# Patient Record
Sex: MALE | Race: WHITE | Employment: FULL TIME | URBAN - NONMETROPOLITAN AREA
[De-identification: names, ages, dates, MRNs, and addresses within clinical notes are randomized per-mention and may not be internally consistent; named-entity substitution may affect disease eponyms.]

---

## 2017-12-12 ENCOUNTER — TRANSFERRED RECORDS (OUTPATIENT)
Dept: HEALTH INFORMATION MANAGEMENT | Facility: HOSPITAL | Age: 22
End: 2017-12-12

## 2017-12-12 LAB
ALT SERPL-CCNC: 40 U/L (ref 18–65)
AST SERPL-CCNC: 16 U/L (ref 10–40)
CHOLEST SERPL-MCNC: 182 MG/DL
CREAT SERPL-MCNC: 1.02 MG/DL (ref 0.8–1.5)
GLUCOSE SERPL-MCNC: 82 MG/DL (ref 60–99)
HDLC SERPL-MCNC: 28 MG/DL
LDLC SERPL CALC-MCNC: 109 MG/DL
POTASSIUM SERPL-SCNC: 5.8 MEQ/L (ref 3.5–5.1)
TRIGL SERPL-MCNC: 225 MG/DL
TSH SERPL-ACNC: 1.72 MIU/ML (ref 0.35–4.8)

## 2018-01-23 ENCOUNTER — OFFICE VISIT (OUTPATIENT)
Dept: SLEEP MEDICINE | Facility: HOSPITAL | Age: 23
End: 2018-01-23
Attending: INTERNAL MEDICINE
Payer: COMMERCIAL

## 2018-01-23 VITALS
HEART RATE: 64 BPM | HEIGHT: 76 IN | OXYGEN SATURATION: 95 % | DIASTOLIC BLOOD PRESSURE: 68 MMHG | BODY MASS INDEX: 32.39 KG/M2 | SYSTOLIC BLOOD PRESSURE: 112 MMHG | RESPIRATION RATE: 12 BRPM | WEIGHT: 266 LBS

## 2018-01-23 DIAGNOSIS — G47.33 OBSTRUCTIVE SLEEP APNEA SYNDROME: Primary | ICD-10-CM

## 2018-01-23 PROCEDURE — 99241 ZZC OFFICE CONSULTATION,LEVEL I: CPT | Performed by: INTERNAL MEDICINE

## 2018-01-23 PROCEDURE — G0463 HOSPITAL OUTPT CLINIC VISIT: HCPCS

## 2018-01-23 RX ORDER — ERGOCALCIFEROL 1.25 MG/1
50000 CAPSULE, LIQUID FILLED ORAL WEEKLY
COMMUNITY

## 2018-01-23 RX ORDER — CETIRIZINE HYDROCHLORIDE 10 MG/1
10 TABLET ORAL DAILY
COMMUNITY

## 2018-01-23 NOTE — MR AVS SNAPSHOT
"              After Visit Summary   2018    Duncan Mcmahon    MRN: [id number]           Patient Information     Date Of Birth          1995        Visit Information        Provider Department      2018 3:00 PM Harish Rojas MD [facility]        Today's Diagnoses     Obstructive sleep apnea syndrome    -  1       Follow-ups after your visit        Future tests that were ordered for you today     Open Future Orders        Priority Expected Expires Ordered    HST-Home Sleep Apnea Test Routine  2018            Who to contact     If you have questions or need follow up information about today's clinic visit or your schedule please contact [facility] directly at [phone].  Normal or non-critical lab and imaging results will be communicated to you by MyChart, letter or phone within 4 business days after the clinic has received the results. If you do not hear from us within 7 days, please contact the clinic through [employer] or phone. If you have a critical or abnormal lab result, we will notify you by phone as soon as possible.  Submit refill requests through [employer] or call your pharmacy and they will forward the refill request to us. Please allow 3 business days for your refill to be completed.          Additional Information About Your Visit        MyChart Information     [employer] lets you send messages to your doctor, view your test results, renew your prescriptions, schedule appointments and more. To sign up, go to [email or web address] . Click on \"Log in\" on the left side of the screen, which will take you to the Welcome page. Then click on \"Sign up Now\" on the right side of the page.     You will be asked to enter the access code listed below, as well as some personal information. Please follow the directions to create your username and password.     Your access code is: [email or web address]  Expires: 2018  3:30 PM     Your access code will  in 90 days. If you need help " "or a new code, please call your [facility] clinic or [phone].        Care EveryWhere ID     This is your Care EveryWhere ID. This could be used by other organizations to access your [facility] medical records  [id number]        Your Vitals Were     Pulse Respirations Height Pulse Oximetry BMI (Body Mass Index)       64 12 6' 4\" (1.93 m) 95% 32.38 kg/m2        Blood Pressure from Last 3 Encounters:   01/23/18 112/68    Weight from Last 3 Encounters:   01/23/18 266 lb (120.7 kg)               Primary Care Provider Office Phone # Fax #    Kayla Ronenaj, [phone]       [facility] [address]  [facility] [address]        Equal Access to Services     KOKI CHEN : Hadii bernie wright hadasho Soomaali, waaxda luqadaha, qaybta kaalmada adeegyada, kishore salinas . So [facility] [phone].    ATENCIÓN: Si habla español, tiene a mccrary disposición servicios gratuitos de asistencia lingüística. Llame al [phone].    We comply with applicable federal civil rights laws and [place] laws. We do not discriminate on the basis of race, color, national origin, age, disability, sex, sexual orientation, or gender identity.            Thank you!     Thank you for choosing [facility]  for your care. Our goal is always to provide you with excellent care. Hearing back from our patients is one way we can continue to improve our services. Please take a few minutes to complete the written survey that you may receive in the mail after your visit with us. Thank you!             Your Updated Medication List - Protect others around you: Learn how to safely use, store and throw away your medicines at www.disposemymeds.org.          This list is accurate as of: 1/23/18  3:30 PM.  Always use your most recent med list.                   Brand Name Dispense Instructions for use Diagnosis    TYLENOL PO      Take 500 mg by mouth every 6 hours as needed for mild pain or fever        vitamin D " 55802 UNIT capsule    ERGOCALCIFEROL     Take 50,000 Units by mouth once a week

## 2018-01-23 NOTE — PROGRESS NOTES
"21 y/o referred for MARIBEL. Pt wife notes loud snoring and apneas/gasps that he is at times aware of. Sleep hours are 930 to 6, no insomnia, does do shift work. No AM headaches, no restless legs. He is sleepy during the day tho this varies somewhat. His weight has been stable, mild obesity.     PMH chronic sinus congestion    SH   Seen with his wife  Assembly line type work    PE  /68  Pulse 64  Resp 12  Ht 6' 4\" (1.93 m)  Wt 266 lb (120.7 kg)  SpO2 95%  BMI 32.38 kg/m2               Heent mild pharyngeal crowding      Current Outpatient Prescriptions:      vitamin D (ERGOCALCIFEROL) 19007 UNIT capsule, Take 50,000 Units by mouth once a week, Disp: , Rfl:      Acetaminophen (TYLENOL PO), Take 500 mg by mouth every 6 hours as needed for mild pain or fever, Disp: , Rfl:      A / MARIBEL  Home study ordered.   "

## 2018-01-23 NOTE — NURSING NOTE
Patient ID checked with name and date of birth. Reviewed allergies and home medications. Took Vitals and brief medical history.   Home sleep test ordered

## 2018-02-20 ENCOUNTER — THERAPY VISIT (OUTPATIENT)
Dept: SLEEP MEDICINE | Facility: HOSPITAL | Age: 23
End: 2018-02-20
Attending: INTERNAL MEDICINE
Payer: COMMERCIAL

## 2018-02-20 DIAGNOSIS — G47.33 OBSTRUCTIVE SLEEP APNEA SYNDROME: Primary | ICD-10-CM

## 2018-02-20 PROCEDURE — 95810 POLYSOM 6/> YRS 4/> PARAM: CPT

## 2018-02-20 PROCEDURE — 95810 POLYSOM 6/> YRS 4/> PARAM: CPT | Mod: 26 | Performed by: INTERNAL MEDICINE

## 2018-02-20 NOTE — LETTER
Duncan Mcmahon  [address] [place] [address]    February 22, 2018       Dear Duncan      I recently read your sleep study, you do have sleep apnea stopping or slowing your breathing  about 15 times per hour.     This is likely disturbing your sleep and making you feel tired during the day. I think we should try you on an automatically adjusting  CPAP mask , hopefully it will make you feel more rested during the day and may help protect you from future health problems.     I will ask our sleep lab staff to set up a trial of the mask, if you have any problems or concerns please give us a call.     I'll plan to see you in follow up in the future and I hope it helps.                                                                                       Sincerely,        Harish Rojas MD, D,[facility]           [address] [place] [address]  [place] [address]  [phone]

## 2018-02-20 NOTE — MR AVS SNAPSHOT
"              After Visit Summary   2018    Duncan Mcmahon    MRN: [id number]           Patient Information     Date Of Birth          1995        Visit Information        Provider Department      2018 8:30 PM [facility]        Today's Diagnoses     Obstructive sleep apnea syndrome    -  1       Follow-ups after your visit        Who to contact     If you have questions or need follow up information about today's clinic visit or your schedule please contact [facility] directly at [phone].  Normal or non-critical lab and imaging results will be communicated to you by MyChart, letter or phone within 4 business days after the clinic has received the results. If you do not hear from us within 7 days, please contact the clinic through [employer] or phone. If you have a critical or abnormal lab result, we will notify you by phone as soon as possible.  Submit refill requests through [employer] or call your pharmacy and they will forward the refill request to us. Please allow 3 business days for your refill to be completed.          Additional Information About Your Visit        [employer] Information     [employer] lets you send messages to your doctor, view your test results, renew your prescriptions, schedule appointments and more. To sign up, go to [email or web address] . Click on \"Log in\" on the left side of the screen, which will take you to the Welcome page. Then click on \"Sign up Now\" on the right side of the page.     You will be asked to enter the access code listed below, as well as some personal information. Please follow the directions to create your username and password.     Your access code is: [email or web address]  Expires: 2018  3:30 PM     Your access code will  in 90 days. If you need help or a new code, please call your [facility] clinic or [phone].        Care EveryWhere ID     This is your Care EveryWhere ID. This could be used by other organizations to access " your [facility] medical records  [id number]         Blood Pressure from Last 3 Encounters:   01/23/18 112/68    Weight from Last 3 Encounters:   01/23/18 266 lb (120.7 kg)              We Performed the Following     Comprehensive DME        Primary Care Provider Office Phone # Fax #    Kayla RosalioorjccalebANA [phone]       [facility] [address]  [facility] [address]        Equal Access to Services     KOKI CHEN : Hadii aad ku hadasho Soomaali, waaxda luqadaha, qaybta kaalmada adeegyada, waxay idiin hayaan adeeg eleanorzhengtucker lanick . So [facility] [phone].    ATENCIÓN: Si habla español, tiene a mccrary disposición servicios gratuitos de asistencia lingüística. [facility] [phone].    We comply with applicable federal civil rights laws and [place] laws. We do not discriminate on the basis of race, color, national origin, age, disability, sex, sexual orientation, or gender identity.            Thank you!     Thank you for choosing [facility]  for your care. Our goal is always to provide you with excellent care. Hearing back from our patients is one way we can continue to improve our services. Please take a few minutes to complete the written survey that you may receive in the mail after your visit with us. Thank you!             Your Updated Medication List - Protect others around you: Learn how to safely use, store and throw away your medicines at www.disposemymeds.org.          This list is accurate as of 2/20/18 11:59 PM.  Always use your most recent med list.                   Brand Name Dispense Instructions for use Diagnosis    cetirizine 10 MG tablet    zyrTEC     Take 10 mg by mouth daily        TYLENOL PO      Take 500 mg by mouth every 6 hours as needed for mild pain or fever        vitamin D 00155 UNIT capsule    ERGOCALCIFEROL     Take 50,000 Units by mouth once a week

## 2018-02-21 NOTE — NURSING NOTE
Patient is here with a complaint of loud snoring and witnessed apneas. He went to sleep quickly and he had respiratory events with an index of 14.2 and low SPO2 85%. He also had moderate to loud snoring. CPAP was not started as patient did not qualify early enough.  Patient tolerated test well.

## 2018-02-21 NOTE — PROGRESS NOTES
Patient is a 22 y/o male in with c/o loud snoring, observed apnea and EDS.  Sleep onset rapid with all stages of sleep recorded.  Light to loud snoring with associated arousals and obstructive respiratory events noted at times.  Patient would have events for a short time and then go long periods where his sleep appeared normal.  Events did seem to be more frequent while supine and in REM stage.  Patient was not attempted on CPAP as events were scattered and most occurred toward end of study during REM stage.  Patient tolerated study well and was woke at predetermined time.

## 2018-02-22 NOTE — PROGRESS NOTES
22 y/o referred by Jeremy Petersen for excessive daytime somnolence.   Overnight 18 channel polysomnography was done 2/20/18, I reviewed the raw data in detail.Please see scanned sleep study for full results.Sleep efficiency was high with a shortened sleep latency and a prolonged REM latency. Sleep architecture shows all stages seen in usual amounts for age. Baseline oxyhemoglobin saturation was 94%. The ECG was monitored and no arrhythmias were seen. There were no significant periodic leg movements noted.              Technician noted loud snoring. We measured 68 apneas and 34 hypopneas early in the study. These events were associated with EEG arousals and desats down to 85%. The apnea hypopnea index was 14 events per hour.              There was not enough time for a cpap titration.    Impression: MARIBEL  autoPap trial.

## 2019-05-03 ENCOUNTER — APPOINTMENT (OUTPATIENT)
Dept: OCCUPATIONAL MEDICINE | Facility: OTHER | Age: 24
End: 2019-05-03

## 2019-05-03 PROCEDURE — 99080 SPECIAL REPORTS OR FORMS: CPT

## 2019-05-03 PROCEDURE — 99199 UNLISTED SPECIAL SVC PX/RPRT: CPT

## 2019-08-26 ENCOUNTER — OFFICE VISIT (OUTPATIENT)
Dept: CHIROPRACTIC MEDICINE | Facility: OTHER | Age: 24
End: 2019-08-26
Attending: CHIROPRACTOR
Payer: COMMERCIAL

## 2019-08-26 DIAGNOSIS — M99.01 SEGMENTAL AND SOMATIC DYSFUNCTION OF CERVICAL REGION: ICD-10-CM

## 2019-08-26 DIAGNOSIS — M99.02 SEGMENTAL AND SOMATIC DYSFUNCTION OF THORACIC REGION: ICD-10-CM

## 2019-08-26 DIAGNOSIS — M99.03 SEGMENTAL AND SOMATIC DYSFUNCTION OF LUMBAR REGION: Primary | ICD-10-CM

## 2019-08-26 DIAGNOSIS — M54.50 ACUTE BILATERAL LOW BACK PAIN WITHOUT SCIATICA: ICD-10-CM

## 2019-08-26 PROCEDURE — 98941 CHIROPRACT MANJ 3-4 REGIONS: CPT | Mod: AT | Performed by: CHIROPRACTOR

## 2019-08-26 PROCEDURE — 99202 OFFICE O/P NEW SF 15 MIN: CPT | Mod: 25 | Performed by: CHIROPRACTOR

## 2019-08-26 NOTE — PROGRESS NOTES
Subjective Finding:    Chief compalint: Patient presents with:  Back Pain  Neck Pain  , Pain Scale: 6/10, Intensity: sharp, Duration: 3 weeks, Radiating:  no.    Date of injury:     Activities that the pain restricts:   Home/household/hobbies/social activities: yes.  Work duties: no.  Sleep: no.  Makes symptoms better: rest.  Makes symptoms worse: activity.  Have you seen anyone else for the symptoms? Yes: MD.  Work related: no.  Automobile related injury: no.    Objective and Assessment:    Posture Analysis:   High shoulder: .  Head tilt: .  High iliac crest: .  Head carriage: forward.  Thoracic Kyphosis: neutral.  Lumbar Lordosis: forward.    Lumbar Range of Motion: extension decreased.  Cervical Range of Motion: extension decreased.  Thoracic Range of Motion: extension decreased.  Extremity Range of Motion: .    Palpation:   Quad lumb: bilateral, referred pain: no    Segmental dysfunction pre-treatment and treatment area: C3, C4, T8, L4 and L5.    Assessment post-treatment:  Cervical: ROM increased.  Thoracic: ROM increased.  Lumbar: ROM increased.    Comments: .      Complicating Factors: .    Procedure(s):  CMT:  81925 Chiropractic manipulative treatment 3-4 regions performed   Cervical: Diversified, See above for level, Supine, Thoracic: Diversified, See above for level, Prone and Lumbar: Diversified, See above for level, Side posture    Modalities:  None performed this visit    Therapeutic procedures:  None    Plan:  Treatment plan: PRN.  Instructed patient: stretch as instructed at visit.  Short term goals: increase ROM.  Long term goals: restore normal function.  Prognosis: very good.

## 2019-08-29 ENCOUNTER — OFFICE VISIT (OUTPATIENT)
Dept: CHIROPRACTIC MEDICINE | Facility: OTHER | Age: 24
End: 2019-08-29
Attending: CHIROPRACTOR
Payer: COMMERCIAL

## 2019-08-29 DIAGNOSIS — M54.50 ACUTE BILATERAL LOW BACK PAIN WITHOUT SCIATICA: ICD-10-CM

## 2019-08-29 DIAGNOSIS — M99.03 SEGMENTAL AND SOMATIC DYSFUNCTION OF LUMBAR REGION: Primary | ICD-10-CM

## 2019-08-29 DIAGNOSIS — M99.01 SEGMENTAL AND SOMATIC DYSFUNCTION OF CERVICAL REGION: ICD-10-CM

## 2019-08-29 DIAGNOSIS — M99.02 SEGMENTAL AND SOMATIC DYSFUNCTION OF THORACIC REGION: ICD-10-CM

## 2019-08-29 PROCEDURE — 98941 CHIROPRACT MANJ 3-4 REGIONS: CPT | Mod: AT | Performed by: CHIROPRACTOR

## 2019-08-29 NOTE — PROGRESS NOTES
Subjective Finding:    Chief compalint: Patient presents with:  Neck Pain  Back Pain  , Pain Scale: 6/10, Intensity: sharp, Duration: 3 weeks, Radiating:  no.    Date of injury:     Activities that the pain restricts:   Home/household/hobbies/social activities: yes.  Work duties: no.  Sleep: no.  Makes symptoms better: rest.  Makes symptoms worse: activity.  Have you seen anyone else for the symptoms? Yes: MD.  Work related: no.  Automobile related injury: no.    Objective and Assessment:    Posture Analysis:   High shoulder: .  Head tilt: .  High iliac crest: .  Head carriage: forward.  Thoracic Kyphosis: neutral.  Lumbar Lordosis: forward.    Lumbar Range of Motion: extension decreased.  Cervical Range of Motion: extension decreased.  Thoracic Range of Motion: extension decreased.  Extremity Range of Motion: .    Palpation:   Quad lumb: bilateral, referred pain: no    Segmental dysfunction pre-treatment and treatment area: C3, C4, T8, L4 and L5.    Assessment post-treatment:  Cervical: ROM increased.  Thoracic: ROM increased.  Lumbar: ROM increased.    Comments: .      Complicating Factors: .    Procedure(s):  CMT:  23157 Chiropractic manipulative treatment 3-4 regions performed   Cervical: Diversified, See above for level, Supine, Thoracic: Diversified, See above for level, Prone and Lumbar: Diversified, See above for level, Side posture    Modalities:  None performed this visit    Therapeutic procedures:  None    Plan:  Treatment plan: PRN.  Instructed patient: stretch as instructed at visit.  Short term goals: increase ROM.  Long term goals: restore normal function.  Prognosis: very good.

## 2019-09-04 ENCOUNTER — OFFICE VISIT (OUTPATIENT)
Dept: CHIROPRACTIC MEDICINE | Facility: OTHER | Age: 24
End: 2019-09-04
Attending: CHIROPRACTOR
Payer: COMMERCIAL

## 2019-09-04 DIAGNOSIS — M99.02 SEGMENTAL AND SOMATIC DYSFUNCTION OF THORACIC REGION: Primary | ICD-10-CM

## 2019-09-04 DIAGNOSIS — M54.50 ACUTE BILATERAL LOW BACK PAIN WITHOUT SCIATICA: ICD-10-CM

## 2019-09-04 DIAGNOSIS — M99.03 SEGMENTAL AND SOMATIC DYSFUNCTION OF LUMBAR REGION: ICD-10-CM

## 2019-09-04 DIAGNOSIS — M99.01 SEGMENTAL AND SOMATIC DYSFUNCTION OF CERVICAL REGION: ICD-10-CM

## 2019-09-04 PROCEDURE — 98941 CHIROPRACT MANJ 3-4 REGIONS: CPT | Mod: AT | Performed by: CHIROPRACTOR

## 2019-09-04 NOTE — PROGRESS NOTES
Subjective Finding:    Chief compalint: Patient presents with:  Back Pain  , Pain Scale: 6/10, Intensity: sharp, Duration: 3 weeks, Radiating:  no.    Date of injury:     Activities that the pain restricts:   Home/household/hobbies/social activities: yes.  Work duties: no.  Sleep: no.  Makes symptoms better: rest.  Makes symptoms worse: activity.  Have you seen anyone else for the symptoms? Yes: MD.  Work related: no.  Automobile related injury: no.    Objective and Assessment:    Posture Analysis:   High shoulder: .  Head tilt: .  High iliac crest: .  Head carriage: forward.  Thoracic Kyphosis: neutral.  Lumbar Lordosis: forward.    Lumbar Range of Motion: extension decreased.  Cervical Range of Motion: extension decreased.  Thoracic Range of Motion: extension decreased.  Extremity Range of Motion: .    Palpation:   Quad lumb: bilateral, referred pain: no    Segmental dysfunction pre-treatment and treatment area: C3, C4, T8, L4 and L5.    Assessment post-treatment:  Cervical: ROM increased.  Thoracic: ROM increased.  Lumbar: ROM increased.    Comments: .      Complicating Factors: .    Procedure(s):  CMT:  14876 Chiropractic manipulative treatment 3-4 regions performed   Cervical: Diversified, See above for level, Supine, Thoracic: Diversified, See above for level, Prone and Lumbar: Diversified, See above for level, Side posture    Modalities:  None performed this visit    Therapeutic procedures:  None    Plan:  Treatment plan: PRN.  Instructed patient: stretch as instructed at visit.  Short term goals: increase ROM.  Long term goals: restore normal function.  Prognosis: very good.

## 2019-09-20 ENCOUNTER — APPOINTMENT (OUTPATIENT)
Dept: OCCUPATIONAL MEDICINE | Facility: OTHER | Age: 24
End: 2019-09-20

## 2019-09-20 PROCEDURE — 99000 SPECIMEN HANDLING OFFICE-LAB: CPT

## 2021-08-02 ENCOUNTER — APPOINTMENT (OUTPATIENT)
Dept: OCCUPATIONAL MEDICINE | Facility: OTHER | Age: 26
End: 2021-08-02

## 2021-08-02 PROCEDURE — 99000 SPECIMEN HANDLING OFFICE-LAB: CPT

## 2022-01-18 ENCOUNTER — HOSPITAL ENCOUNTER (EMERGENCY)
Facility: HOSPITAL | Age: 27
Discharge: HOME | End: 2022-01-18
Attending: NURSE PRACTITIONER | Admitting: NURSE PRACTITIONER
Payer: OTHER MISCELLANEOUS

## 2022-01-18 ENCOUNTER — APPOINTMENT (OUTPATIENT)
Dept: GENERAL RADIOLOGY | Facility: HOSPITAL | Age: 27
End: 2022-01-18
Attending: NURSE PRACTITIONER
Payer: OTHER MISCELLANEOUS

## 2022-01-18 VITALS
OXYGEN SATURATION: 98 % | DIASTOLIC BLOOD PRESSURE: 95 MMHG | RESPIRATION RATE: 16 BRPM | SYSTOLIC BLOOD PRESSURE: 161 MMHG | HEART RATE: 103 BPM | TEMPERATURE: 98 F

## 2022-01-18 DIAGNOSIS — S92.501B: Primary | ICD-10-CM

## 2022-01-18 PROCEDURE — 12001 RPR S/N/AX/GEN/TRNK 2.5CM/<: CPT | Performed by: NURSE PRACTITIONER

## 2022-01-18 PROCEDURE — 73630 X-RAY EXAM OF FOOT: CPT | Mod: RT

## 2022-01-18 PROCEDURE — 90715 TDAP VACCINE 7 YRS/> IM: CPT | Performed by: NURSE PRACTITIONER

## 2022-01-18 PROCEDURE — 999N000104 HC STATISTIC NO CHARGE

## 2022-01-18 PROCEDURE — 90471 IMMUNIZATION ADMIN: CPT | Performed by: NURSE PRACTITIONER

## 2022-01-18 PROCEDURE — 250N000011 HC RX IP 250 OP 636: Performed by: NURSE PRACTITIONER

## 2022-01-18 PROCEDURE — 12001 RPR S/N/AX/GEN/TRNK 2.5CM/<: CPT

## 2022-01-18 RX ORDER — ALBUTEROL SULFATE 90 UG/1
AEROSOL, METERED RESPIRATORY (INHALATION)
COMMUNITY
Start: 2021-06-14

## 2022-01-18 RX ORDER — CEPHALEXIN 500 MG/1
500 CAPSULE ORAL 2 TIMES DAILY
Qty: 14 CAPSULE | Refills: 0 | Status: SHIPPED | OUTPATIENT
Start: 2022-01-18 | End: 2022-01-25

## 2022-01-18 RX ADMIN — CLOSTRIDIUM TETANI TOXOID ANTIGEN (FORMALDEHYDE INACTIVATED), CORYNEBACTERIUM DIPHTHERIAE TOXOID ANTIGEN (FORMALDEHYDE INACTIVATED), BORDETELLA PERTUSSIS TOXOID ANTIGEN (GLUTARALDEHYDE INACTIVATED), BORDETELLA PERTUSSIS FILAMENTOUS HEMAGGLUTININ ANTIGEN (FORMALDEHYDE INACTIVATED), BORDETELLA PERTUSSIS PERTACTIN ANTIGEN, AND BORDETELLA PERTUSSIS FIMBRIAE 2/3 ANTIGEN 0.5 ML: 5; 2; 2.5; 5; 3; 5 INJECTION, SUSPENSION INTRAMUSCULAR at 15:29

## 2022-01-18 ASSESSMENT — ENCOUNTER SYMPTOMS
DIZZINESS: 0
ACTIVITY CHANGE: 1
CHILLS: 0
FEVER: 0
ARTHRALGIAS: 1

## 2022-01-18 NOTE — ED PROVIDER NOTES
History     Chief Complaint   Patient presents with     Foot Pain     HPI  Duncan Mcmahon is a 26 year old male who presents by self to the urgent care with complaints of right sided foot and toe pain after dropping a 1500 lb piece of equipment on foot while at work today. He was wearing steel toe boots at the time, but the equipment missed the steel toe and landed on lateral aspect of right foot. He complaints of pain with movement. He denies numbness or tingling and is able to move digits with some limited ROM to 4th and 5th toes due to previous fractures. There is no uncontrolled bleeding. His last Tdap is outdated and was last administered in 2006.     Allergies:  Allergies   Allergen Reactions     Seasonal Allergies        Problem List:    There are no problems to display for this patient.       Past Medical History:    History reviewed. No pertinent past medical history.    Past Surgical History:    History reviewed. No pertinent surgical history.    Family History:    History reviewed. No pertinent family history.    Social History:  Marital Status:   [2]  Social History     Tobacco Use     Smoking status: None     Smokeless tobacco: None   Substance Use Topics     Alcohol use: None     Drug use: None        Medications:    Acetaminophen (TYLENOL PO)  albuterol (PROAIR HFA/PROVENTIL HFA/VENTOLIN HFA) 108 (90 Base) MCG/ACT inhaler  cephALEXin (KEFLEX) 500 MG capsule  cetirizine (ZYRTEC) 10 MG tablet  sertraline (ZOLOFT) 50 MG tablet  vitamin D (ERGOCALCIFEROL) 77422 UNIT capsule          Review of Systems   Constitutional: Positive for activity change. Negative for chills and fever.   Musculoskeletal: Positive for arthralgias (right 5th toe) and gait problem (due to pain in right foot/toe).   Skin: Negative for pallor and rash.   Neurological: Negative for dizziness.   All other systems reviewed and are negative.      Physical Exam   BP: 161/95  Pulse: 103  Temp: 98  F (36.7  C)  Resp: 16  SpO2: 98  %      Physical Exam  Vitals and nursing note reviewed.   Constitutional:       Appearance: Normal appearance. He is obese. He is not ill-appearing or toxic-appearing.   HENT:      Head: Normocephalic.   Eyes:      Pupils: Pupils are equal, round, and reactive to light.   Cardiovascular:      Rate and Rhythm: Normal rate.      Pulses:           Dorsalis pedis pulses are 2+ on the right side.   Pulmonary:      Effort: Pulmonary effort is normal.   Musculoskeletal:         General: Tenderness and signs of injury present.      Cervical back: Neck supple.      Right lower leg: Laceration, tenderness and bony tenderness (right 4th and 5th toes) present. No swelling. No edema.      Left lower leg: No swelling. No edema.        Feet:    Feet:      Right foot:      Skin integrity: Skin integrity normal. No blister.      Toenail Condition: Right toenails are normal.      Comments: Laceration near back of right 5th toe  Skin:     General: Skin is warm and dry.      Capillary Refill: Capillary refill takes less than 2 seconds.      Coloration: Skin is not pale.      Findings: Bruising (sole of right foot at base of 5th toe ) present. No erythema.   Neurological:      Mental Status: He is alert.         ED Course                 [facility]    -Laceration Repair    Date/Time: 1/18/2022 3:45 PM  Performed by: Eusebia Chen  Authorized by: [facility] – [place], Prudence, CNP     Risks, benefits and alternatives discussed.      ANESTHESIA (see MAR for exact dosages):     Anesthesia method:  Topical application    Topical anesthetic:  LET  LACERATION DETAILS     Location:  Toe    Toe location:  R little toe    Length (cm):  0.3    REPAIR TYPE:     Repair type:  Simple      EXPLORATION:     Hemostasis achieved with:  LET and direct pressure    Wound exploration: wound explored through full range of motion and entire depth of wound probed and visualized      Wound extent: underlying fracture      Wound extent: no foreign body, no nerve  damage and no tendon damage      Contaminated: no      TREATMENT:     Area cleansed with:  Saline    Amount of cleaning:  Standard    Irrigation solution:  Sterile saline    Irrigation volume:  250    Irrigation method:  Pressure wash    Visualized foreign bodies/material removed: no      SKIN REPAIR     Repair method:  Tissue adhesive    APPROXIMATION     Approximation:  Close    POST-PROCEDURE DETAILS     Dressing:  Adhesive bandage and non-adherent dressing        PROCEDURE    Patient Tolerance:  Patient tolerated the procedure well with no immediate complications               Results for orders placed or performed during the hospital encounter of 01/18/22 (from the past 24 hour(s))   Foot  XR, G/E 3 views, right    Narrative    Exam: XR FOOT RIGHT G/E 3 VIEWS    Technique: Right foot, 3 Views    Comparison: None.    Exam reason: dropped equipment on foot.    Findings:  There is a comminuted and mildly displaced fracture of the distal  phalanx of the fifth toe. There is also an essentially nondisplaced  transverse fracture of the proximal phalanx of the fifth toe. Joint  spaces are otherwise well maintained.      There is soft tissue swelling about the fifth toe.      Impression    Impression:  Comminuted and mildly displaced fracture of the distal phalanx of the  fifth toe.    Essentially nondisplaced transverse fracture of the proximal phalanx  of the fifth toe.    FARZANA MARADIAGA MD         SYSTEM ID:  [id number]       Medications   Tdap (tetanus-diphtheria-acell pertussis) (ADACEL) injection 0.5 mL (0.5 mLs Intramuscular Given 1/18/22 1529)       Assessments & Plan (with Medical Decision Making)     I have reviewed the nursing notes.    I have reviewed the findings, diagnosis, plan and need for follow up with the patient.  (A68.015G) Open displaced fracture of phalanx of lesser toe of right foot, unspecified phalanx, initial encounter  (primary encounter diagnosis)  Comment: 26 year old male with complaints of  right sided toe pain after dropping a 1500 lb piece of equipment on foot while at work. CMS intact and no uncontrolled bleeding. Pulses present and equal bilaterally. Open displaced fracture to right 5th toe found on xray. Bleeding controlled with topical lidocaine with epinephrine. Skin glue applied.   Plan: Orthopedic  Referral placed. Cephalexin 500mg by mouth BID for 7 days prescribed. Instruction provided on elevation, ice, acetaminophen, and ibuprofen. Ortho walker boot and dressing applied. Education provided on signs and symptoms of infection and to follow up with PCP as needed.           Discharge Medication List as of 1/18/2022  3:55 PM      START taking these medications    Details   cephALEXin (KEFLEX) 500 MG capsule Take 1 capsule (500 mg) by mouth 2 times daily for 7 days, Disp-14 capsule, R-0, E-Prescribe             Final diagnoses:   Open displaced fracture of phalanx of lesser toe of right foot, unspecified phalanx, initial encounter       Eusebia Chen, Nurse Practitioner Student  [facility]     I have seen this patient with the student. The student documented the visit and I have edited and verified the history, physical examination, assessment and plan. The examination and medical decision making was performed by me.     1/18/2022   [facility]     Mpofu, Stephanie, CNP  01/18/22 1959

## 2022-01-18 NOTE — ED TRIAGE NOTES
Pt presents with c/o right outter foot pain. Reports that he was moving a piece equipment and it fell on his foot. Pt is wearing steel toe shoes. Incident happened around 1415pm. Pt has not had any otc meds.

## 2022-01-18 NOTE — DISCHARGE INSTRUCTIONS
Keep affected extremity elevated as much as possible for next 24 - 48 hours. Ice to affected area 20 minutes every hour as needed for comfort. After 48 hours you can apply heat. Ibuprofen 600 to 800 mg (3 - 4 tabs of over the counter med) every six to eight hours as needed;not to exceed maximum amount of 3200 mg in 24 hours. Take with food. Tylenol 650 to 1000 mg every four to six hours as needed (not to exceed more than 4000 mg in a 24 hour period). May use interchangeably. Suggest medicating around the clock for the next 24-48 hours. Use walker boot splint  until you have completed your follow-up appointment. Slowly start to wiggle but not beyond the point of pain. Follow up with podiatry. Follow up with primary care as needed.

## 2022-01-20 ENCOUNTER — OFFICE VISIT (OUTPATIENT)
Dept: PODIATRY | Facility: OTHER | Age: 27
End: 2022-01-20
Attending: PODIATRIST
Payer: OTHER MISCELLANEOUS

## 2022-01-20 VITALS
DIASTOLIC BLOOD PRESSURE: 85 MMHG | TEMPERATURE: 97 F | SYSTOLIC BLOOD PRESSURE: 143 MMHG | OXYGEN SATURATION: 98 % | HEART RATE: 82 BPM

## 2022-01-20 DIAGNOSIS — S92.501B: Primary | ICD-10-CM

## 2022-01-20 DIAGNOSIS — S99.821A TRAUMATIC FRACTURE OF TOENAIL OF RIGHT FOOT, INITIAL ENCOUNTER: ICD-10-CM

## 2022-01-20 DIAGNOSIS — L97.514: ICD-10-CM

## 2022-01-20 PROCEDURE — 99204 OFFICE O/P NEW MOD 45 MIN: CPT | Performed by: PODIATRIST

## 2022-01-20 RX ORDER — CEPHALEXIN 500 MG/1
500 CAPSULE ORAL 4 TIMES DAILY
Qty: 28 CAPSULE | Refills: 0 | Status: SHIPPED | OUTPATIENT
Start: 2022-01-20 | End: 2022-01-27

## 2022-01-20 ASSESSMENT — PAIN SCALES - GENERAL: PAINLEVEL: MODERATE PAIN (4)

## 2022-01-20 NOTE — NURSING NOTE
"Chief Complaint   Patient presents with     Fracture     right lesser toe       Initial BP (!) 143/85 (BP Location: Left arm, Patient Position: Sitting, Cuff Size: Adult Regular)   Pulse 82   Temp 97  F (36.1  C) (Tympanic)   SpO2 98%  Estimated body mass index is 32.38 kg/m  as calculated from the following:    Height as of 1/23/18: 1.93 m (6' 4\").    Weight as of 1/23/18: 120.7 kg (266 lb).  Medication Reconciliation: allison Kennedy  "

## 2022-01-20 NOTE — PROGRESS NOTES
Occupational Visit     SUBJECTIVE:  Duncan Mcmahon, 26 year old, male is seen for new evaluation and treatment of occupational injury. Date of injury is 01/18/2022.    Patient works for [employer].    Patient was at work and he was lifting a cowl (1500 to 2000 lbs) with a crane. As he tried to pivot the crane, the cowl came loose and dropped on his foot. He was wearing the steel toed boots but the cowl just missed the steel and hit the toe where there was not a lot of steel protection.    He had instant pain in the foot. He went right to Urgent Care within 15 minutes of crushing his foot. He says they cleaned the foot, flushed the wound, then used a glue to seal the skin. He had a dressing applied and he left it on until today on 01/20/2022. His dressing was full of blood in the office today. He was also given a prescription for Keflex 500mg BID, but he did not pick it up because he says he didn't have a ride to the pharmacy. He'll be picking this up later today because he'll have a ride from his mom.    He had less pain by the end of the day while taking Tylenol and Ibuprofen and elevating the foot. He has been wearing a CAM boot to offload the foot.     No further pedal complaints today.     Patient does not use tobacco products.       Linked Episodes   Type: Episode: Status: Noted: Resolved: Last update: Updated by:   WORK COMP Duncan Mcmahon Active 1/18/2022 1/20/2022 10:54 AM Bertha Kennedy      Comments:       Musculoskeletal problem/pain      Duration: Injury on 01/18/2022    Description  Location: RIGHT fifth toe    Intensity:  severe    Accompanying signs and symptoms: swelling    History  Previous similar problem: no   Previous evaluation:  x-ray    Precipitating or alleviating factors:  Trauma or overuse: YES  Aggravating factors include: standing, walking and climbing stairs    Therapies tried and outcome: rest/inactivity        Allergies   Allergen Reactions     Seasonal Allergies         OBJECTIVE:  Vitals:    01/20/22 1043   BP: (!) 143/85   Pulse: 82   Temp: 97  F (36.1  C)   SpO2: 98%         ROS: 10 point ROS neg other than the symptoms noted above in the HPI.  EXAM  Constitutional: healthy, alert and no distress    Psychiatric: mentation appears normal and affect normal/bright    RIGHT FOOT FOCUSED    VASCULAR:  -Dorsalis pedis pulse +2/4   -Posterior tibial pulse +2/4   -Capillary refill time < 3 seconds to b/l hallux  -Hair growth Present to b/l anterior legs and ankles    NEURO:  -Light touch sensation intact to b/l plantar forefoot    DERM:  -Skin temperature, texture and turgor WNL b/l except for description below    Wound Location:  RIGHT plantar proximal fifth digit  01/202/2022  Measurement:  0.2cm x 0.2cm x 0.8cm to the bone of the proximal phalanx  ---Smaller wound located 0.5cm lateral to plantar wound measures 0.1cm x 0.1cm x deep to bone of proximal phalanx  Drainage:  Moderate serous  Odor:  None  Edges:  Lateral border and plantar border of entire fifth digit is moderately macerated  Base:  100% bone -- bone is firm on palpation  Surrounding Skin: Intact with maceration  No severe erythema, no ascending erythema, no calor, no purulence, no malodor, no other signs of infection.       MSK:  -Pain on palpation to RIGHT plantar fifth digit ulcerations  -Muscle strength of ankles +5/5 for dorsiflexion, plantarflexion, ABDUction and ADDuction b/l      Labs: No results found for this or any previous visit (from the past 24 hour(s)).      RADIOGRAPH RIGHT FOOT 01/18/2022  Impression:  Comminuted and mildly displaced fracture of the distal phalanx of the  fifth toe.     Essentially nondisplaced transverse fracture of the proximal phalanx  of the fifth toe.     FARZANA MARADIAGA MD            ASSESSMENT/  (Z39.199C) Open displaced fracture of phalanx of lesser toe of right foot, unspecified phalanx, initial encounter  (primary encounter diagnosis)    (G52.693W) Traumatic fracture of  toenail of right foot, initial encounter    (L97.514) Skin ulcer of small toe of right foot, with necrosis of bone (H)        PLAN:    -Patient evaluated and examined. Treatment options discussed with no educational barriers noted.    -Reviewed Urgent Care notes. Patient's wound was flushed in Urgent Care and cleaned. The toe was attempted to be closed with a tissue adhesive glue. Due to the maceration, the adhesive did not hold and the ulcerations were wide open in the office today. Keflex 500mg BID was ordered, but patient did not pick this up yet.    Ulcers:  -The plantar ulcerations probe to bone and the jair-wound skin is moderately macerated. A suture may easily rip through the skin given the amount of maceration around the wounds.  -Thoroughly flushed the wound sites with an angiocatheter using a betadine/saline mixture. There was no non-viable tissue present so no debridement was required toady.  -Applied a dressing with betadine soaked gauze, dry gauze and tape  -Dispensed Vashe to patient. He should clean the wounds every day with Vashe, then apply a betadine dressing around the fifth toe (including in the fourth interspace), dry gauze and tape to keep the wound dry.    -Offloading: Wear the CAM boot 100% of the time. Full offloading is recommended initially to keep pressure off the toe and allow the wound time to heal. Patient says he will not use crutches or a knee scooter because he doesn't plan on walking much and he does not think they will work well at his home. He is advised to fully offload but he is declining offloading devices today. This will be more heavily stressed if the wound worsens or fails to heal. The patient said he will not do any excessive activities and he will be off his foot as much as possible.  -Explained to patient the severity of an open fracture to the bone. The primary fracture is distal, but the wounds probe to the mild fracture of the proximal phalanx. If the bone becomes  "infected, this could result in a toe amputation. If a severe infection develops, he may even require a more proximal foot or leg amputation, or death can result. Although he is on antibiotics, it is important for him to change the dressing daily and monitor the wound for signs of infection. He is in agreement with this plan.  -Discontinued the prescription for Keflex BID and wrote a new prescription for Keflex 500mg four times daily in attempt to avoid an infection of the toe and bone. Patient is advised to take this with food and to eat yogurt while taking the antibiotics. He is in agreement with this plan.  -Return to work is an estimated four weeks. The fracture site will not heal in this time period, but the wound may heal in four weeks or less. He will not be released to work until the wound site is at least healed.    -Reviewed radiographs from 01/18/2022 including reviewed radiologist report. The distal phalanx is comminuted and will unlikely ever fully heal to look like a \"normal\" distal phalanx. If the fragments cause pain or issues in the future, a distal toe amputation is an option. Although this bone will unlikely heal, this will unlikely cause any long term pain or concerns as long as the plantar ulcerations heel.    Total time spent preparing to see the patient, review of chart from Urgent Care, obtaining history and physical examination, review of x-rays, education, discussion with patient and documenting in Epic / EMR was 50 minutes.  All time involved was spent on the day of service for the patient (the same day as the patient's appointment).    -Patient in agreement with the above treatment plan and all of patient's questions were answered.      Return to clinic one week to evaluate RIGHT toe ulceration      "

## 2022-01-20 NOTE — PATIENT INSTRUCTIONS
"Things to :    - dressing supplies at [facility] (\[facility] near [facility]  - antibiotic at [facility] (Keflex 500mg and takek four times daily with food. Eat yogurt as well to help avoid stomach upset).  - betadine at [facility]    Dressing:  -Change the dressing every day on the RIGHT little toe.  -Clean wound with Vashe. Place Vashe on gauze and dab the wounds.  -Apply betadine on the right fifth toe. Cover the bottom and the side of the toes that faces the other toes.  -Cover with dry gauze and tape    Offloading:  Wear the CAM boot at all times and minimize walking as much as possible  "

## 2022-01-27 ENCOUNTER — OFFICE VISIT (OUTPATIENT)
Dept: PODIATRY | Facility: OTHER | Age: 27
End: 2022-01-27
Attending: PODIATRIST
Payer: OTHER MISCELLANEOUS

## 2022-01-27 VITALS
TEMPERATURE: 98.1 F | OXYGEN SATURATION: 97 % | SYSTOLIC BLOOD PRESSURE: 146 MMHG | DIASTOLIC BLOOD PRESSURE: 72 MMHG | HEART RATE: 104 BPM

## 2022-01-27 DIAGNOSIS — S99.821A TRAUMATIC FRACTURE OF TOENAIL OF RIGHT FOOT, INITIAL ENCOUNTER: ICD-10-CM

## 2022-01-27 DIAGNOSIS — L97.514: ICD-10-CM

## 2022-01-27 DIAGNOSIS — S92.501B: Primary | ICD-10-CM

## 2022-01-27 PROCEDURE — 11042 DBRDMT SUBQ TIS 1ST 20SQCM/<: CPT | Performed by: PODIATRIST

## 2022-01-27 ASSESSMENT — PAIN SCALES - GENERAL: PAINLEVEL: NO PAIN (0)

## 2022-01-27 NOTE — PROGRESS NOTES
Occupational Visit     SUBJECTIVE:  Duncan Mcmahon, 26 year old, male is seen for new evaluation and treatment of occupational injury. Date of injury is 01/18/2022.    Patient works for [employer].    Patient was at work and he was lifting a cowl (1500 to 2000 lbs) with a crane. As he tried to pivot the crane, the cowl came loose and dropped on his foot. He was wearing the steel toed boots but the cowl just missed the steel and hit the toe where there was not a lot of steel protection.    -----------------------     He has been changing the dressing every day with Adaptic, betadine gauze, dry gauze and tape.    He is wearing the CAM boot every day and whenever he is walking. He is taking Keflex 500mg four times daily. He occasionally  Takes it three times daily when it is bothering his stomach.    He has had no pain from the toe.    No further pedal complaints today.     Patient does not use tobacco products.       No linked episodes    Musculoskeletal problem/pain      Duration: Injury on 01/18/2022    Description  Location: RIGHT fifth toe    Intensity:  severe    Accompanying signs and symptoms: swelling    History  Previous similar problem: no   Previous evaluation:  x-ray    Precipitating or alleviating factors:  Trauma or overuse: YES  Aggravating factors include: standing, walking and climbing stairs    Therapies tried and outcome: rest/inactivity        Allergies   Allergen Reactions     Seasonal Allergies        OBJECTIVE:  Vitals:    01/27/22 1410   BP: (!) 146/72   Pulse: 104   Temp: 98.1  F (36.7  C)   SpO2: 97%         ROS: 10 point ROS neg other than the symptoms noted above in the HPI.  EXAM  Constitutional: healthy, alert and no distress    Psychiatric: mentation appears normal and affect normal/bright    RIGHT FOOT FOCUSED    VASCULAR:  -Dorsalis pedis pulse +2/4   -Posterior tibial pulse +2/4   -Capillary refill time < 3 seconds to b/l hallux  -Hair growth Present to b/l anterior legs and  ankles    NEURO:  -Light touch sensation intact to b/l plantar forefoot    DERM:  -Skin temperature, texture and turgor WNL b/l except for description below    Wound Location:  RIGHT plantar proximal fifth digit  01/27/2022  Measurement:  0.2cm x 0.3cm x 0.5cm to the muscle/capsule layer   ---Smaller wound located 0.5cm lateral to plantar wound has healed  Drainage:  Moderate serous  Odor:  None  Edges:  No maceration  Base:  100% muscle/capsule layer close to bone  Surrounding Skin: Intact with dry skin (no further  Maceration)  No severe erythema, no ascending erythema, no calor, no purulence, no malodor, no other signs of infection.     01/20/2022  Measurement:  0.2cm x 0.2cm x 0.8cm to the bone of the proximal phalanx  ---Smaller wound located 0.5cm lateral to plantar wound measures 0.1cm x 0.1cm x deep to bone of proximal phalanx  Drainage:  Moderate serous  Odor:  None  Edges:  Lateral border and plantar border of entire fifth digit is moderately macerated  Base:  100% bone -- bone is firm on palpation  Surrounding Skin: Intact with maceration  No severe erythema, no ascending erythema, no calor, no purulence, no malodor, no other signs of infection.       MSK:  -Pain on palpation to RIGHT plantar fifth digit ulcerations  -Muscle strength of ankles +5/5 for dorsiflexion, plantarflexion, ABDUction and ADDuction b/l      Labs: No results found for this or any previous visit (from the past 24 hour(s)).      RADIOGRAPH RIGHT FOOT 01/18/2022  Impression:  Comminuted and mildly displaced fracture of the distal phalanx of the  fifth toe.     Essentially nondisplaced transverse fracture of the proximal phalanx  of the fifth toe.     FARZANA MARADIAGA MD          ASSESSMENT/  (S92.891G) Open displaced fracture of phalanx of lesser toe of right foot, unspecified phalanx, initial encounter  (primary encounter diagnosis)    (S99.262L) Traumatic fracture of toenail of right foot, initial encounter    (L97.514) Skin ulcer of  small toe of right foot, with necrosis of bone (H)        PLAN:    -Patient evaluated and examined. Treatment options discussed with no educational barriers noted.    Traumatic skin ulcer of right plantar fifth digit:  -The plantar ulceration probes deep (0.5cm) and is close to bone but no longer probing to bone  -The interdigital space as no longer macerated and very dry    -Excisional debridement of wound on RIGHT plantar fifth digit with a sharp tissue nipper, 15 blade and sharp ring curette to the level of and including the subcutaneous tissue layer (debrided a total of less than 20 centimeters squared and did not debride the muscle or bone layer) with all non-viable soft tissue debrided from the wound bed.  -Debrided ulceration in attempt to decrease the biofilm layer, promote healing and in attempt to prevent infection  ---Dressed wound on RIGHT plantar 5th digit with Aquacel Ag strip placed between the toes and mildly moistened with normal sterile saline to the plantar digital ulcer and the interspace.  ---Secured with dry gauze and tape  ---Dispensed additional dressing supplies      -Offloading: Wear the CAM boot 100% of the time. Limit walking as much as possible to allow the plantar wound to heal.  -Estimated return to work date remains: 02/24/2022    -Patient in agreement with the above treatment plan and all of patient's questions were answered.      Return to clinic one week to evaluate RIGHT toe ulceration

## 2022-02-04 ENCOUNTER — OFFICE VISIT (OUTPATIENT)
Dept: PODIATRY | Facility: OTHER | Age: 27
End: 2022-02-04
Attending: PODIATRIST
Payer: OTHER MISCELLANEOUS

## 2022-02-04 VITALS
SYSTOLIC BLOOD PRESSURE: 133 MMHG | DIASTOLIC BLOOD PRESSURE: 83 MMHG | TEMPERATURE: 97.9 F | HEART RATE: 104 BPM | OXYGEN SATURATION: 96 %

## 2022-02-04 DIAGNOSIS — S99.821A TRAUMATIC FRACTURE OF TOENAIL OF RIGHT FOOT, INITIAL ENCOUNTER: ICD-10-CM

## 2022-02-04 DIAGNOSIS — L97.511: ICD-10-CM

## 2022-02-04 DIAGNOSIS — S92.501B: Primary | ICD-10-CM

## 2022-02-04 PROCEDURE — 99212 OFFICE O/P EST SF 10 MIN: CPT | Performed by: PODIATRIST

## 2022-02-04 ASSESSMENT — PAIN SCALES - GENERAL: PAINLEVEL: NO PAIN (0)

## 2022-02-04 NOTE — NURSING NOTE
"Chief Complaint   Patient presents with     Fracture       Initial /83 (BP Location: Left arm, Patient Position: Sitting, Cuff Size: Adult Regular)   Pulse 104   Temp 97.9  F (36.6  C) (Tympanic)   SpO2 96%  Estimated body mass index is 32.38 kg/m  as calculated from the following:    Height as of 1/23/18: 1.93 m (6' 4\").    Weight as of 1/23/18: 120.7 kg (266 lb).  Medication Reconciliation: allison Kennedy  "

## 2022-02-04 NOTE — PROGRESS NOTES
Occupational Visit     SUBJECTIVE:  Duncan Mcmahon, 26 year old, male is seen for new evaluation and treatment of occupational injury. Date of injury is 01/18/2022.    Patient works for [employer].    History of Injury:  Patient was at work and he was lifting a cowl (1500 to 2000 lbs) with a crane. As he tried to pivot the crane, the cowl came loose and dropped on his foot. He was wearing the steel toed boots but the cowl just missed the steel and hit the toe where there was not a lot of steel protection.    -----------------------     He has been changing the dressing every day with Adaptic lightly moistened with normal sterile saline on the bottom of the RIGHT fifth toe,  dry gauze and tape.    He is wearing the CAM boot every day and whenever he is walking. He was previously taking Keflex 500mg four times daily. He sometimes gets a tingling / burning sensation in the toe at night that makes it more difficult for him to sleep.    He has had no pain from the toe.    No further pedal complaints today.     Patient does not use tobacco products.       No linked episodes    Musculoskeletal problem/pain      Duration: Injury on 01/18/2022    Description  Location: RIGHT fifth toe    Intensity:  severe    Accompanying signs and symptoms: swelling    History  Previous similar problem: no   Previous evaluation:  x-ray    Precipitating or alleviating factors:  Trauma or overuse: YES  Aggravating factors include: standing, walking and climbing stairs    Therapies tried and outcome: rest/inactivity        Allergies   Allergen Reactions     Seasonal Allergies        OBJECTIVE:  Vitals:    02/04/22 1506   BP: 133/83   Pulse: 104   Temp: 97.9  F (36.6  C)   SpO2: 96%       ROS: 10 point ROS neg other than the symptoms noted above in the HPI.  EXAM  Constitutional: healthy, alert and no distress    Psychiatric: mentation appears normal and affect normal/bright    RIGHT FOOT FOCUSED    VASCULAR:  -Dorsalis pedis pulse +2/4    -Posterior tibial pulse +2/4   -Capillary refill time < 3 seconds to  hallux  -Hair growth Present to anterior leg and ankle    NEURO:  -Light touch sensation intact to plantar forefoot    DERM:  -Skin temperature, texture and turgor WNL except for description below    Wound Location:  RIGHT plantar proximal fifth digit  02/04/2022  Measurement:  0.1cm x 0.4cm x minimal through the skin layer only  Drainage:  Minimal serous  Odor:  None  Edges:  No maceration near wound site but there is maceration between the 2nd and 3rd interdigital interspace  Base:  100% minimal skin breakdown only  Surrounding Skin: Intact with dry skin  No severe erythema, no ascending erythema, no calor, no purulence, no malodor, no other signs of infection.     01/27/2022  Measurement:  0.2cm x 0.3cm x 0.5cm to the muscle/capsule layer   ---Smaller wound located 0.5cm lateral to plantar wound has healed  Drainage:  Moderate serous  Odor:  None  Edges:  No maceration  Base:  100% muscle/capsule layer close to bone  Surrounding Skin: Intact with dry skin (no further  Maceration)  No severe erythema, no ascending erythema, no calor, no purulence, no malodor, no other signs of infection.     01/20/2022:  0.2cm x 0.2cm x 0.8cm to the bone of the proximal phalanx  ---Smaller wound located 0.5cm lateral to plantar wound measures 0.1cm x 0.1cm x deep to bone of proximal phalanx    MSK:  -Pain on palpation to RIGHT plantar fifth digit ulcerations  -Muscle strength of ankles +5/5 for dorsiflexion, plantarflexion, ABDUction and ADDuction b/l      Labs: No results found for this or any previous visit (from the past 24 hour(s)).      RADIOGRAPH RIGHT FOOT 01/18/2022  Impression:  Comminuted and mildly displaced fracture of the distal phalanx of the  fifth toe.     Essentially nondisplaced transverse fracture of the proximal phalanx  of the fifth toe.     FARZANA MARADIAGA MD          ASSESSMENT/  (S92.501B) Open displaced fracture of phalanx of lesser  toe of right foot, unspecified phalanx, initial encounter  (primary encounter diagnosis)    (S99.821A) Traumatic fracture of toenail of right foot, initial encounter    (L97.511) Skin ulcer of small toe of right foot, limited to breakdown of skin (H)      PLAN:    -Patient evaluated and examined. Treatment options discussed with no educational barriers noted.    -Radiograph for RIGHT foot ordered for follow-up appointment on 02/14/2022    Traumatic skin ulcer of right plantar fifth digit:  -The plantar ulceration is now superficial with minimal breakdown of skin layer only  -The interdigital space is healed    -Minimal debridement of ulceration required.  ---Dressed wound on RIGHT plantar 5th digit with Aquacel Ag strip placed between the toes and mildly moistened with normal sterile saline to the plantar digital ulcer and the interspace.  ---Applied betadine swab between the 3rd and 2nd interdigital interspace due to mild maceration between toes  ---Secured with dry gauze and tape  ---Dispensed additional dressing supplies  ---Patient to continue changing dressing every two day    -Radiograph ordered for follow-up on 02/14/2022    -Offloading: Wear the CAM boot 100% of the time. Limit walking as much as possible to allow the plantar wound to heal.  -Estimated return to work date remains: 02/24/2022    -Patient in agreement with the above treatment plan and all of patient's questions were answered.      Return to clinic one week to evaluate RIGHT toe ulceration

## 2022-02-14 ENCOUNTER — OFFICE VISIT (OUTPATIENT)
Dept: PODIATRY | Facility: OTHER | Age: 27
End: 2022-02-14
Attending: PODIATRIST
Payer: OTHER MISCELLANEOUS

## 2022-02-14 ENCOUNTER — ANCILLARY PROCEDURE (OUTPATIENT)
Dept: GENERAL RADIOLOGY | Facility: OTHER | Age: 27
End: 2022-02-14
Attending: PODIATRIST
Payer: OTHER MISCELLANEOUS

## 2022-02-14 VITALS
HEIGHT: 76 IN | DIASTOLIC BLOOD PRESSURE: 78 MMHG | TEMPERATURE: 97.6 F | SYSTOLIC BLOOD PRESSURE: 125 MMHG | OXYGEN SATURATION: 98 % | BODY MASS INDEX: 32.39 KG/M2 | WEIGHT: 266 LBS | HEART RATE: 99 BPM

## 2022-02-14 DIAGNOSIS — L97.511: ICD-10-CM

## 2022-02-14 DIAGNOSIS — S92.531D CLOSED DISPLACED FRACTURE OF DISTAL PHALANX OF LESSER TOE OF RIGHT FOOT WITH ROUTINE HEALING, SUBSEQUENT ENCOUNTER: Primary | ICD-10-CM

## 2022-02-14 DIAGNOSIS — S92.501B: ICD-10-CM

## 2022-02-14 DIAGNOSIS — S99.821A TRAUMATIC FRACTURE OF TOENAIL OF RIGHT FOOT, INITIAL ENCOUNTER: ICD-10-CM

## 2022-02-14 PROCEDURE — 73630 X-RAY EXAM OF FOOT: CPT | Mod: TC | Performed by: RADIOLOGY

## 2022-02-14 PROCEDURE — 99212 OFFICE O/P EST SF 10 MIN: CPT | Performed by: PODIATRIST

## 2022-02-14 ASSESSMENT — PAIN SCALES - GENERAL: PAINLEVEL: NO PAIN (0)

## 2022-02-14 ASSESSMENT — MIFFLIN-ST. JEOR: SCORE: 2283.07

## 2022-02-14 NOTE — PROGRESS NOTES
Occupational Visit     SUBJECTIVE:  Duncan Mcmahon, 26 year old, male is seen for new evaluation and treatment of occupational injury. Date of injury is 01/18/2022.    Patient works for [employer].    History of Injury:  Patient was at work and he was lifting a cowl (1500 to 2000 lbs) with a crane. As he tried to pivot the crane, the cowl came loose and dropped on his foot. He was wearing the steel toed boots but the cowl just missed the steel and hit the toe where there was not a lot of steel protection.    -----------------------     He has continued changing the dressing every day with Aquacel Ag minimally moistened with normal sterile saline on the plantar digit, gauze and tape.    He is wearing the CAM boot every day and whenever he is walking. He has minimal pain along the RIGHT fifth digit, but he is starting to get some heel pain from the CAM boot.    No further pedal complaints today.     Patient does not use tobacco products.       No linked episodes    Musculoskeletal problem/pain      Duration: Injury on 01/18/2022    Description  Location: RIGHT fifth toe    Intensity:  severe    Accompanying signs and symptoms: swelling    History  Previous similar problem: no   Previous evaluation:  x-ray    Precipitating or alleviating factors:  Trauma or overuse: YES  Aggravating factors include: standing, walking and climbing stairs    Therapies tried and outcome: rest/inactivity        Allergies   Allergen Reactions     Seasonal Allergies        OBJECTIVE:  Vitals:    02/14/22 1457   BP: 125/78   Pulse: 99   Temp: 97.6  F (36.4  C)   SpO2: 98%       ROS: 10 point ROS neg other than the symptoms noted above in the HPI.  EXAM  Constitutional: healthy, alert and no distress    Psychiatric: mentation appears normal and affect normal/bright    RIGHT FOOT FOCUSED    VASCULAR:  -Dorsalis pedis pulse +2/4   -Posterior tibial pulse +2/4   -Capillary refill time < 3 seconds to  hallux  -Hair growth Present to anterior leg  and ankle    NEURO:  -Light touch sensation intact to plantar forefoot    DERM:  -Skin temperature within normal limits to bilateral foot  -Skin diffusely dry and flaking to RIGHT forefoot, especially to the interdigital spaces    Wound Location:  RIGHT plantar proximal fifth digit  02/14/2022: Healed     02/04/2022  Measurement:  0.1cm x 0.4cm x minimal through the skin layer only  Drainage:  Minimal serous  Odor:  None  Edges:  No maceration near wound site but there is maceration between the 2nd and 3rd interdigital interspace  Base:  100% minimal skin breakdown only  Surrounding Skin: Intact with dry skin  No severe erythema, no ascending erythema, no calor, no purulence, no malodor, no other signs of infection.     01/27/2022:  0.2cm x 0.3cm x 0.5cm to the muscle/capsule layer   ---Smaller wound located 0.5cm lateral to plantar wound has healed    01/20/2022:  0.2cm x 0.2cm x 0.8cm to the bone of the proximal phalanx  ---Smaller wound located 0.5cm lateral to plantar wound measures 0.1cm x 0.1cm x deep to bone of proximal phalanx    MSK:  -No pain on palpation to RIGHT plantar fifth digit or to the IPJs  -Muscle strength of ankles +5/5 for dorsiflexion, plantarflexion, ABDUction and ADDuction b/l      Labs:   Results for orders placed or performed in visit on 02/14/22 (from the past 24 hour(s))   XR FOOT RT G/E 3 VW (Clinic Performed)    Narrative    PROCEDURE:  XR FOOT RIGHT G/E 3 VIEWS    HISTORY: WB -- Right foot fifth toe open fx in January, 2022. Please  evaluate for healing of bone. Thank you; Open displaced fracture of  phalanx of lesser toe of right foot, unspecified phalanx, initial  encounter; Traumatic fracture of toenail of right foot, initial  encounter; Skin ulcer of small toe of right foot, limited to breakdown  of skin (H)    COMPARISON:  January 2022    TECHNIQUE:  3 views of the left foot were obtained.    FINDINGS:  Comminuted fracture of the distal fifth toe is again noted.  Alignment is  unchanged from previous examination. Early ossified  callus is seen.       Impression    IMPRESSION: Healing fifth toe fracture      SARAH MAYS MD         SYSTEM ID:  [id number]         RADIOGRAPH RIGHT FOOT 01/18/2022  Impression:  Comminuted and mildly displaced fracture of the distal phalanx of the  fifth toe.     Essentially nondisplaced transverse fracture of the proximal phalanx  of the fifth toe.     FARZANA MARADIAGA MD     RADIOGRAPH RIGHT FOOT 02/14/2022  FINDINGS:  Comminuted fracture of the distal fifth toe is again noted.  Alignment is unchanged from previous examination. Early ossified  callus is seen.                                                                       IMPRESSION: Healing fifth toe fracture       SARAH MAYS MD         ASSESSMENT/  (I78.890W) Closed displaced fracture of distal phalanx of lesser toe of right foot with routine healing, subsequent encounter  (primary encounter diagnosis)      PLAN:    -Patient evaluated and examined. Treatment options discussed with no educational barriers noted.    -Radiograph for RIGHT foot reviewed from 02/14/2022: Evidence of bone callus. There are still multiple fragments of the distal phalanx of the digit. This will unlikely fully heal and consolidate as one solid bone. This is not a concern unless the toe is painful and remains painful from the bone fragments. There is minimal weight placed on the distal phalanx of the fifth digit while walking, so the toe may not cause him pain as he progressing to WB.    Offloading:  -Fractures take an average of six weeks to heal. Will slowly progress him to WB in a regular shoe with WB as tolerated over the next two weeks. Pain should be his guide and if he has pain while walking, then he should return to the CAM boot. He is not to walk without a rigid shoe or boot on his foot. If he progresses to walking comfortably by his follow-up, then he may potentially return to work without restrictions.  Patient says he cannot return to his job without being released without restrictions because of the type of work that he does.    -No further dressings required for the [now healed] wound on the plantar toe.      -Patient in agreement with the above treatment plan and all of patient's questions were answered.      Return to clinic one week to evaluate RIGHT toe ulceration

## 2022-02-14 NOTE — NURSING NOTE
"Chief Complaint   Patient presents with     Consult      01/18/2022 RIGHTFOOT Please due Work ability and fax to  [employer] with all notes.       Initial /78   Pulse 99   Temp 97.6  F (36.4  C)   Ht 1.93 m (6' 4\")   Wt 120.7 kg (266 lb)   SpO2 98%   BMI 32.38 kg/m   Estimated body mass index is 32.38 kg/m  as calculated from the following:    Height as of this encounter: 1.93 m (6' 4\").    Weight as of this encounter: 120.7 kg (266 lb).  Medication Reconciliation: complete  Elena De Leon LPN    "

## 2022-02-14 NOTE — PATIENT INSTRUCTIONS
Thank you for allowing  and our Podiatry team to participate in your care. Please call our office at [phone] with scheduling questions or with any other questions or concerns.

## 2022-02-14 NOTE — LETTER
February 14, 2022      Duncan Mcmahon  [address] [place] [id number]        To Whom It May Concern:    Duncan Mcmahon  was seen on 02/14/2022.  He cannot yet return to work full time without restrictions. He is slowly working his way back into walking in a regular shoe. He will follow-up with podiatry on 03/03/2022.        Sincerely,        Gavi Dutta DPM

## 2022-03-03 ENCOUNTER — OFFICE VISIT (OUTPATIENT)
Dept: PODIATRY | Facility: OTHER | Age: 27
End: 2022-03-03
Attending: PODIATRIST
Payer: OTHER MISCELLANEOUS

## 2022-03-03 VITALS
OXYGEN SATURATION: 95 % | DIASTOLIC BLOOD PRESSURE: 83 MMHG | HEART RATE: 110 BPM | SYSTOLIC BLOOD PRESSURE: 122 MMHG | TEMPERATURE: 98.4 F

## 2022-03-03 DIAGNOSIS — S92.531D CLOSED DISPLACED FRACTURE OF DISTAL PHALANX OF LESSER TOE OF RIGHT FOOT WITH ROUTINE HEALING, SUBSEQUENT ENCOUNTER: Primary | ICD-10-CM

## 2022-03-03 PROCEDURE — 99213 OFFICE O/P EST LOW 20 MIN: CPT | Performed by: PODIATRIST

## 2022-03-03 RX ORDER — SERTRALINE HYDROCHLORIDE 100 MG/1
100 TABLET, FILM COATED ORAL DAILY
COMMUNITY
Start: 2022-03-01

## 2022-03-03 ASSESSMENT — PAIN SCALES - GENERAL: PAINLEVEL: NO PAIN (0)

## 2022-03-03 NOTE — NURSING NOTE
"Chief Complaint   Patient presents with     Work Comp       Initial /83   Pulse 110   Temp 98.4  F (36.9  C)   SpO2 95%  Estimated body mass index is 32.38 kg/m  as calculated from the following:    Height as of 2/14/22: 1.93 m (6' 4\").    Weight as of 2/14/22: 120.7 kg (266 lb).  Medication Reconciliation: complete  Yvonne Berkowitz MA  "

## 2022-03-03 NOTE — PROGRESS NOTES
Occupational Visit     SUBJECTIVE:  Duncan Mcmahon, 27 year old, male is seen for new evaluation and treatment of occupational injury. Date of injury is 01/18/2022.    Patient works for [employer].    History of Injury:  Patient was at work and he was lifting a cowl (1500 to 2000 lbs) with a crane. As he tried to pivot the crane, the cowl came loose and dropped on his foot. He was wearing the steel toed boots but the cowl just missed the steel and hit the toe where there was not a lot of steel protection.    -----------------------     He has not been applying dressings to the foot. He is applying lotion 2-3 times a day with a natural Hemp C lotion because he has a lot of sensitivity to lotions.    He is wearing a regular shoe / boot today and he says they have been comfortable. He is walking consistently. He gets a little tired as he tries to regain some strength in his leg.     His only complaint is that he does not have feeling in the plantar lateral foot near the fifth metatarsal head and the fifth toe. It is easier for him to wear a rigid boot versus a tennis shoe.    No further pedal complaints today.     Patient does not use tobacco products.       Linked Episodes   Type: Episode: Status: Noted: Resolved: Last update: Updated by:   WORK COMP Duncan Mcmahon Active 1/18/2022  3/3/2022 12:35 PM Bertha Kennedy      Comments:       Musculoskeletal problem/pain      Duration: Injury on 01/18/2022    Description  Location: RIGHT fifth toe    Intensity:  severe    Accompanying signs and symptoms: swelling    History  Previous similar problem: no   Previous evaluation:  x-ray    Precipitating or alleviating factors:  Trauma or overuse: YES  Aggravating factors include: standing, walking and climbing stairs    Therapies tried and outcome: rest/inactivity        Allergies   Allergen Reactions     Seasonal Allergies        OBJECTIVE:  Vitals:    03/03/22 1331   BP: 122/83   Pulse: 110   Temp: 98.4  F (36.9  C)   SpO2:  95%       ROS: 10 point ROS neg other than the symptoms noted above in the HPI.  EXAM  Constitutional: healthy, alert and no distress    Psychiatric: mentation appears normal and affect normal/bright    RIGHT FOOT FOCUSED    VASCULAR:  -Dorsalis pedis pulse +2/4   -Posterior tibial pulse +2/4   -Capillary refill time < 3 seconds to  hallux  -Hair growth Present to anterior leg and ankle    NEURO:  -No light touch sensation with SWM on LEFT fifth toe (dorsal, lateral and plantar to the fifth metatarsal head has no light touch sensation)  -Light touch sensation intact to remainder of the plantar forefoot     DERM:  -Skin temperature within normal limits to bilateral foot  -Skin diffusely dry and flaking to RIGHT plantar forefoot, especially to the interdigital spaces    Wound Location:  RIGHT plantar proximal fifth digit  03/03/2022: Healed    02/14/2022: Healed     02/04/2022:  0.1cm x 0.4cm x minimal through the skin layer only    01/27/2022:  0.2cm x 0.3cm x 0.5cm to the muscle/capsule layer   ---Smaller wound located 0.5cm lateral to plantar wound has healed    01/20/2022:  0.2cm x 0.2cm x 0.8cm to the bone of the proximal phalanx  ---Smaller wound located 0.5cm lateral to plantar wound measures 0.1cm x 0.1cm x deep to bone of proximal phalanx    MSK:  -No pain on palpation to RIGHT plantar fifth digit or to the IPJs  -Muscle strength of ankles +5/5 for dorsiflexion, plantarflexion, ABDUction and ADDuction b/l      Labs:   No results found for this or any previous visit (from the past 24 hour(s)).      RADIOGRAPH RIGHT FOOT 01/18/2022  Impression:  Comminuted and mildly displaced fracture of the distal phalanx of the  fifth toe.     Essentially nondisplaced transverse fracture of the proximal phalanx  of the fifth toe.     FARZANA MARADIAGA MD     RADIOGRAPH RIGHT FOOT 02/14/2022  FINDINGS:  Comminuted fracture of the distal fifth toe is again noted.  Alignment is unchanged from previous examination. Early  ossified  callus is seen.                                                                       IMPRESSION: Healing fifth toe fracture       SARAH MAYS MD         ASSESSMENT/  (V16.910Y) Closed displaced fracture of distal phalanx of lesser toe of right foot with routine healing, subsequent encounter  (primary encounter diagnosis)        PLAN:    -Patient evaluated and examined. Treatment options discussed with no educational barriers noted.    Offloading:  -Patient is more comfortably walking in his work boot. He says he is still regaining strength for walking. He may return to work on Monday, 03/07/2022. He may call earlier if he has too much pain upon returning to work, but his progress today is positive and he appears ready to return.    -Will follow-up for one final x-ray and ensure patient is comfortable working without restrictions in his boot. If he feels like he lost too much strength, then physical therapy may be required. At this time, patient said he was working on regaining his own strength and he declined PT at this time.     -Final radiographs ordered for follow-up appointment on 03/18/2022    -Patient in agreement with the above treatment plan and all of patient's questions were answered.      Return to clinic two weeks to evaluate RIGHT toe fracture after returning to work with x-rays prior to appointment

## 2022-03-18 ENCOUNTER — OFFICE VISIT (OUTPATIENT)
Dept: PODIATRY | Facility: OTHER | Age: 27
End: 2022-03-18
Attending: PODIATRIST
Payer: COMMERCIAL

## 2022-03-18 ENCOUNTER — ANCILLARY PROCEDURE (OUTPATIENT)
Dept: GENERAL RADIOLOGY | Facility: OTHER | Age: 27
End: 2022-03-18
Attending: PODIATRIST
Payer: COMMERCIAL

## 2022-03-18 VITALS
HEART RATE: 107 BPM | OXYGEN SATURATION: 94 % | TEMPERATURE: 97.9 F | DIASTOLIC BLOOD PRESSURE: 82 MMHG | SYSTOLIC BLOOD PRESSURE: 118 MMHG

## 2022-03-18 DIAGNOSIS — S92.531D CLOSED DISPLACED FRACTURE OF DISTAL PHALANX OF LESSER TOE OF RIGHT FOOT WITH ROUTINE HEALING, SUBSEQUENT ENCOUNTER: Primary | ICD-10-CM

## 2022-03-18 DIAGNOSIS — S92.531D CLOSED DISPLACED FRACTURE OF DISTAL PHALANX OF LESSER TOE OF RIGHT FOOT WITH ROUTINE HEALING, SUBSEQUENT ENCOUNTER: ICD-10-CM

## 2022-03-18 PROCEDURE — 99212 OFFICE O/P EST SF 10 MIN: CPT | Performed by: PODIATRIST

## 2022-03-18 PROCEDURE — G0463 HOSPITAL OUTPT CLINIC VISIT: HCPCS | Mod: 25

## 2022-03-18 PROCEDURE — 73630 X-RAY EXAM OF FOOT: CPT | Mod: TC,RT

## 2022-03-18 ASSESSMENT — PAIN SCALES - GENERAL: PAINLEVEL: MODERATE PAIN (4)

## 2022-03-18 NOTE — PROGRESS NOTES
Occupational Visit     SUBJECTIVE:  Duncan Mcmahon, 27 year old, male is seen for new evaluation and treatment of occupational injury. Date of injury is 01/18/2022.    Patient works for [employer].    History of Injury:  Patient was at work and he was lifting a cowl (1500 to 2000 lbs) with a crane. As he tried to pivot the crane, the cowl came loose and dropped on his foot. He was wearing the steel toed boots but the cowl just missed the steel and hit the toe where there was not a lot of steel protection.    -----------------------     He has been working full time. He still gets a hot, poker sensation in the toe and it seems to fluctuate with temperature changes. He does not necessarily get more or less pain from working or wearing his work boots. He otherwise thinks he is tolerating working with minimal pain and he wants to continue working. He also think he has more feeling in his toe.      No further pedal complaints today.     Patient does not use tobacco products.       Linked Episodes   Type: Episode: Status: Noted: Resolved: Last update: Updated by:   WORK COMP Duncan Mcmahon Active 1/18/2022  3/18/2022 10:59 AM Bertha Kennedy      Comments:       Musculoskeletal problem/pain      Duration: Injury on 01/18/2022    Description  Location: RIGHT fifth toe    Intensity:  severe    Accompanying signs and symptoms: swelling    History  Previous similar problem: no   Previous evaluation:  x-ray    Precipitating or alleviating factors:  Trauma or overuse: YES  Aggravating factors include: standing, walking and climbing stairs    Therapies tried and outcome: rest/inactivity        Allergies   Allergen Reactions     Seasonal Allergies        OBJECTIVE:  Vitals:    03/18/22 1343   BP: 118/82   Pulse: 107   Temp: 97.9  F (36.6  C)   SpO2: 94%       ROS: 10 point ROS neg other than the symptoms noted above in the HPI.  EXAM  Constitutional: healthy, alert and no distress    Psychiatric: mentation appears normal and  affect normal/bright    RIGHT FOOT FOCUSED    VASCULAR:  -Dorsalis pedis pulse +2/4   -Posterior tibial pulse +2/4   -Capillary refill time < 3 seconds to  hallux  -Hair growth Present to anterior leg and ankle    NEURO:  -Light touch sensation intact to the RIGHT lateral fifth toe along the dorsal and lateral toe     (dorsal, lateral and plantar to the fifth metatarsal head has no light touch sensation)  -Light touch sensation intact to remainder of the plantar forefoot     DERM:  -Skin temperature within normal limits to bilateral foot  -Skin diffusely dry and flaking to RIGHT plantar forefoot, especially to the interdigital spaces    Wound Location:  RIGHT plantar proximal fifth digit  03/18/2022: Healed    03/03/2022: Healed    02/14/2022: Healed     02/04/2022:  0.1cm x 0.4cm x minimal through the skin layer only    01/27/2022:  0.2cm x 0.3cm x 0.5cm to the muscle/capsule layer   ---Smaller wound located 0.5cm lateral to plantar wound has healed    01/20/2022:  0.2cm x 0.2cm x 0.8cm to the bone of the proximal phalanx  ---Smaller wound located 0.5cm lateral to plantar wound measures 0.1cm x 0.1cm x deep to bone of proximal phalanx    MSK:  -No pain on palpation to RIGHT plantar fifth digit or to the IPJs  -Muscle strength of ankles +5/5 for dorsiflexion, plantarflexion, ABDUction and ADDuction b/l      Labs:   No results found for this or any previous visit (from the past 24 hour(s)).      RADIOGRAPH RIGHT FOOT 01/18/2022  Impression:  Comminuted and mildly displaced fracture of the distal phalanx of the  fifth toe.     Essentially nondisplaced transverse fracture of the proximal phalanx  of the fifth toe.     FARZANA MARADIAGA MD     RADIOGRAPH RIGHT FOOT 02/14/2022  FINDINGS:  Comminuted fracture of the distal fifth toe is again noted.  Alignment is unchanged from previous examination. Early ossified  callus is seen.                                                                       IMPRESSION: Healing fifth  toe fracture       SARAH MAYS MD       RIGHT FOOT RADIOGRAPH 03/18/2022                                                                       IMPRESSION: No change in alignment of the fifth toe fracture from  previous examination. Early ossified callus is seen.       SARAH MAYS MD          -----------------------------------------------------------------------    ASSESSMENT/  (J87.004L) Closed displaced fracture of distal phalanx of lesser toe of right foot with routine healing, subsequent encounter  (primary encounter diagnosis)        PLAN:    -Patient evaluated and examined. Treatment options discussed with no educational barriers noted.    -Dispensed size small toe sleeve. Patient may find these at [facility] or [facility]. Patient should not wear the toe sleeve(s) at night, but only wear the sleeve in shoes and/or socks during the day. The sleeves are re-usable and hand washable until they wear out.  ---This may control a little edema to the toe and may decrease some of the hot poker feeling in his toe. He is advised to call if the pain worsens.    Offloading:  -No restrictions. Patient may resume working.    -Final radiographs evaluated from 03/18/2022 -- Mild improvement of the fracture fragments. No concerning changes at this time. Patient will be called with the results.    -Patient in agreement with the above treatment plan and all of patient's questions were answered.      Return to clinic as needed

## 2022-03-18 NOTE — NURSING NOTE
"Chief Complaint   Patient presents with     Wound Check       Initial /82 (BP Location: Left arm, Patient Position: Sitting, Cuff Size: Adult Regular)   Pulse 107   Temp 97.9  F (36.6  C) (Tympanic)   SpO2 94%  Estimated body mass index is 32.38 kg/m  as calculated from the following:    Height as of 2/14/22: 1.93 m (6' 4\").    Weight as of 2/14/22: 120.7 kg (266 lb).  Medication Reconciliation: allison Kennedy  "

## 2022-03-21 ENCOUNTER — TELEPHONE (OUTPATIENT)
Dept: PODIATRY | Facility: OTHER | Age: 27
End: 2022-03-21
Payer: COMMERCIAL